# Patient Record
Sex: MALE | Race: WHITE | NOT HISPANIC OR LATINO | Employment: OTHER | URBAN - METROPOLITAN AREA
[De-identification: names, ages, dates, MRNs, and addresses within clinical notes are randomized per-mention and may not be internally consistent; named-entity substitution may affect disease eponyms.]

---

## 2017-06-05 PROBLEM — I49.3 FREQUENT PVCS: Status: ACTIVE | Noted: 2017-06-05

## 2017-06-12 PROBLEM — I27.20 PULMONARY HTN: Status: ACTIVE | Noted: 2017-06-12

## 2017-07-02 PROBLEM — N18.4 CKD (CHRONIC KIDNEY DISEASE), STAGE IV: Status: ACTIVE | Noted: 2017-07-02

## 2017-07-02 PROBLEM — I82.431 ACUTE DEEP VEIN THROMBOSIS (DVT) OF POPLITEAL VEIN OF RIGHT LOWER EXTREMITY: Status: ACTIVE | Noted: 2017-07-02

## 2017-07-02 PROBLEM — J96.11 CHRONIC RESPIRATORY FAILURE WITH HYPOXIA: Status: ACTIVE | Noted: 2017-07-02

## 2017-07-02 PROBLEM — R60.0 BILATERAL LEG EDEMA: Status: ACTIVE | Noted: 2017-07-02

## 2017-07-02 PROBLEM — E66.01 SEVERE OBESITY (BMI >= 40): Status: ACTIVE | Noted: 2017-07-02

## 2017-07-02 PROBLEM — I27.81 COR PULMONALE: Status: ACTIVE | Noted: 2017-07-02

## 2017-07-02 PROBLEM — L03.116 CELLULITIS OF LEFT LOWER EXTREMITY: Status: ACTIVE | Noted: 2017-07-02

## 2017-08-14 PROBLEM — D68.59 THROMBOPHILIA: Status: ACTIVE | Noted: 2017-08-14

## 2017-08-14 PROBLEM — L03.90 CELLULITIS: Status: ACTIVE | Noted: 2017-08-14

## 2017-08-21 PROBLEM — I87.2 VENOUS INSUFFICIENCY OF LEFT LEG: Status: ACTIVE | Noted: 2017-08-21

## 2017-08-25 PROBLEM — G62.9 NEUROPATHY: Status: ACTIVE | Noted: 2017-08-25

## 2017-08-28 PROBLEM — T50.905A DELIRIUM, INDUCED BY DRUG: Status: ACTIVE | Noted: 2017-08-28

## 2017-08-28 PROBLEM — R41.0 DELIRIUM, INDUCED BY DRUG: Status: ACTIVE | Noted: 2017-08-28

## 2017-08-28 PROBLEM — R41.82 ALTERED MENTAL STATE: Status: ACTIVE | Noted: 2017-08-28

## 2017-08-30 PROBLEM — I50.9 ACUTE ON CHRONIC CONGESTIVE HEART FAILURE: Status: ACTIVE | Noted: 2017-08-30

## 2017-09-02 PROBLEM — T50.905A DELIRIUM, INDUCED BY DRUG: Status: RESOLVED | Noted: 2017-08-28 | Resolved: 2017-09-02

## 2017-09-02 PROBLEM — R41.0 DELIRIUM, INDUCED BY DRUG: Status: RESOLVED | Noted: 2017-08-28 | Resolved: 2017-09-02

## 2017-09-19 ENCOUNTER — OFFICE VISIT (OUTPATIENT)
Dept: DERMATOLOGY | Facility: CLINIC | Age: 77
End: 2017-09-19
Payer: COMMERCIAL

## 2017-09-19 VITALS — WEIGHT: 267 LBS | BODY MASS INDEX: 34.27 KG/M2 | HEIGHT: 74 IN

## 2017-09-19 DIAGNOSIS — Z85.828 PERSONAL HISTORY OF OTHER MALIGNANT NEOPLASM OF SKIN: Primary | ICD-10-CM

## 2017-09-19 DIAGNOSIS — Z12.83 SKIN CANCER SCREENING: ICD-10-CM

## 2017-09-19 DIAGNOSIS — L82.0 INFLAMED SEBORRHEIC KERATOSIS: ICD-10-CM

## 2017-09-19 DIAGNOSIS — L30.4 INTERTRIGO: ICD-10-CM

## 2017-09-19 PROCEDURE — 3008F BODY MASS INDEX DOCD: CPT | Mod: S$GLB,,, | Performed by: DERMATOLOGY

## 2017-09-19 PROCEDURE — 99214 OFFICE O/P EST MOD 30 MIN: CPT | Mod: 25,S$GLB,, | Performed by: DERMATOLOGY

## 2017-09-19 PROCEDURE — 1126F AMNT PAIN NOTED NONE PRSNT: CPT | Mod: S$GLB,,, | Performed by: DERMATOLOGY

## 2017-09-19 PROCEDURE — 17110 DESTRUCTION B9 LES UP TO 14: CPT | Mod: S$GLB,,, | Performed by: DERMATOLOGY

## 2017-09-19 PROCEDURE — 1159F MED LIST DOCD IN RCRD: CPT | Mod: S$GLB,,, | Performed by: DERMATOLOGY

## 2017-09-19 PROCEDURE — 99999 PR PBB SHADOW E&M-EST. PATIENT-LVL II: CPT | Mod: PBBFAC,,, | Performed by: DERMATOLOGY

## 2017-09-19 RX ORDER — CLOTRIMAZOLE AND BETAMETHASONE DIPROPIONATE 10; .64 MG/G; MG/G
CREAM TOPICAL
Qty: 45 G | Refills: 2 | Status: SHIPPED | OUTPATIENT
Start: 2017-09-19 | End: 2017-10-24 | Stop reason: ALTCHOICE

## 2017-09-19 NOTE — PROGRESS NOTES
Subjective:       Patient ID:  Xi Unger is a 77 y.o. male who presents for No chief complaint on file.    High risk patient here for f/u, Last office visit 6/2016. Presents today for skin check. Has a spot chest that has been there for years. It has grown in size and is itchy.        History NMSC, BCC- back s/p ED&C, invasive SCC chest s/p excision  Neg FMHx skin cancer.         Recently dx cellulitis, left leg. Currently undergoing IV abx.     Growth on back, tender, weeks, asx no tx  Wears compression hose, not currently while cellulitis flared.     Intertrigo, controlled with lotrisone, desires refill, usually involved inguinal folds and buttocks    Review of Systems   Constitutional: Negative for weight loss, weight gain, fatigue and malaise.   Skin: Negative for daily sunscreen use, activity-related sunscreen use and wears hat.   Hematologic/Lymphatic: Bruises/bleeds easily.        Objective:    Physical Exam   Constitutional: He appears well-developed and well-nourished. No distress.   HENT:   Mouth/Throat: Lips normal.    Eyes: Lids are normal.  No conjunctival no injection.   Cardiovascular: There is dependent edema.     Neurological: He is alert and oriented to person, place, and time. He is not disoriented.   Psychiatric: He has a normal mood and affect.   Skin:   Areas Examined (abnormalities noted in diagram):   Scalp / Hair Palpated and Inspected  Head / Face Inspection Performed  Neck Inspection Performed  Chest / Axilla Inspection Performed  Abdomen Inspection Performed  Back Inspection Performed  RUE Inspected  LUE Inspection Performed  RLE Inspected  LLE Inspection Performed                   Diagram Legend     Erythematous scaling macule/papule c/w actinic keratosis       Vascular papule c/w angioma      Pigmented verrucoid papule/plaque c/w seborrheic keratosis      Yellow umbilicated papule c/w sebaceous hyperplasia      Irregularly shaped tan macule c/w lentigo     1-2 mm smooth white  papules consistent with Milia      Movable subcutaneous cyst with punctum c/w epidermal inclusion cyst      Subcutaneous movable cyst c/w pilar cyst      Firm pink to brown papule c/w dermatofibroma      Pedunculated fleshy papule(s) c/w skin tag(s)      Evenly pigmented macule c/w junctional nevus     Mildly variegated pigmented, slightly irregular-bordered macule c/w mildly atypical nevus      Flesh colored to evenly pigmented papule c/w intradermal nevus       Pink pearly papule/plaque c/w basal cell carcinoma      Erythematous hyperkeratotic cursted plaque c/w SCC      Surgical scar with no sign of skin cancer recurrence      Open and closed comedones      Inflammatory papules and pustules      Verrucoid papule consistent consistent with wart     Erythematous eczematous patches and plaques     Dystrophic onycholytic nail with subungual debris c/w onychomycosis     Umbilicated papule    Erythematous-base heme-crusted tan verrucoid plaque consistent with inflamed seborrheic keratosis     Erythematous Silvery Scaling Plaque c/w Psoriasis     See annotation      Assessment / Plan:        Personal history of other malignant neoplasm of skin  Area(s) of previous NMSC evaluated with no signs of recurrence.    Upper body skin examination performed today including at least 6 points as noted in physical examination. No lesions suspicious for malignancy noted.      Skin cancer screening  Area(s) of previous NMSC evaluated with no signs of recurrence.    Upper body skin examination performed today including at least 6 points as noted in physical examination. No lesions suspicious for malignancy noted.      Intertrigo  -     clotrimazole-betamethasone 1-0.05% (LOTRISONE) cream; aaa bid x 1-2 weeks then prn, avoid chronic use.  Dispense: 45 g; Refill: 2    Inflamed seborrheic keratosis  Cryosurgery procedure note:    Verbal consent from the patient is obtained. Liquid nitrogen cryosurgery is applied to 1 lesions to produce a  freeze injury. The patient is aware that blisters may form and is instructed on wound care with gentle cleansing and use of vaseline ointment to keep moist until healed. The patient is supplied a handout on cryosurgery and is instructed to call if lesions do not completely resolve. Risk of dyspigmentation discussed.                Return in about 6 months (around 3/19/2018).

## 2018-07-24 PROBLEM — L03.116 CELLULITIS OF LEFT LOWER EXTREMITY: Status: RESOLVED | Noted: 2017-07-02 | Resolved: 2018-07-24

## 2018-08-30 PROBLEM — K59.00 CONSTIPATION: Status: ACTIVE | Noted: 2018-08-30

## 2018-09-17 PROBLEM — E61.1 IRON DEFICIENCY: Status: ACTIVE | Noted: 2018-09-17

## 2018-09-27 PROBLEM — I50.9 ACUTE CONGESTIVE HEART FAILURE: Status: ACTIVE | Noted: 2018-09-27

## 2018-09-27 PROBLEM — L03.116 CELLULITIS OF LEFT LOWER EXTREMITY: Status: ACTIVE | Noted: 2018-09-27

## 2018-09-27 PROBLEM — Z86.718 HISTORY OF DVT IN ADULTHOOD: Status: ACTIVE | Noted: 2017-07-02

## 2018-09-27 PROBLEM — I50.31 ACUTE DIASTOLIC HEART FAILURE: Status: ACTIVE | Noted: 2018-09-27

## 2018-09-27 PROBLEM — M54.9 BACK PAIN: Status: ACTIVE | Noted: 2018-09-27
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
## 2018-10-05 ENCOUNTER — OFFICE VISIT (OUTPATIENT)
Dept: INFECTIOUS DISEASES | Facility: CLINIC | Age: 78
End: 2018-10-05
Payer: COMMERCIAL

## 2018-10-05 VITALS
SYSTOLIC BLOOD PRESSURE: 124 MMHG | OXYGEN SATURATION: 95 % | HEART RATE: 59 BPM | BODY MASS INDEX: 30.16 KG/M2 | TEMPERATURE: 97 F | WEIGHT: 235 LBS | HEIGHT: 74 IN | DIASTOLIC BLOOD PRESSURE: 75 MMHG

## 2018-10-05 DIAGNOSIS — D80.1 HYPOGAMMAGLOBULINEMIA: ICD-10-CM

## 2018-10-05 DIAGNOSIS — I87.2 VENOUS INSUFFICIENCY OF LEFT LEG: ICD-10-CM

## 2018-10-05 DIAGNOSIS — R29.898 BILATERAL LEG WEAKNESS: ICD-10-CM

## 2018-10-05 DIAGNOSIS — Z79.2 ENCOUNTER FOR LONG-TERM (CURRENT) USE OF ANTIBIOTICS: Primary | ICD-10-CM

## 2018-10-05 DIAGNOSIS — M00.862 ARTHRITIS OF LEFT KNEE DUE TO OTHER BACTERIA: ICD-10-CM

## 2018-10-05 DIAGNOSIS — R60.0 BILATERAL LEG EDEMA: ICD-10-CM

## 2018-10-05 PROBLEM — M00.9 PYOGENIC ARTHRITIS OF LEFT KNEE JOINT: Status: ACTIVE | Noted: 2018-10-05

## 2018-10-05 PROCEDURE — 3078F DIAST BP <80 MM HG: CPT | Mod: ,,, | Performed by: INTERNAL MEDICINE

## 2018-10-05 PROCEDURE — 99214 OFFICE O/P EST MOD 30 MIN: CPT | Mod: ,,, | Performed by: INTERNAL MEDICINE

## 2018-10-05 PROCEDURE — 3074F SYST BP LT 130 MM HG: CPT | Mod: ,,, | Performed by: INTERNAL MEDICINE

## 2018-10-05 PROCEDURE — 1101F PT FALLS ASSESS-DOCD LE1/YR: CPT | Mod: ,,, | Performed by: INTERNAL MEDICINE

## 2018-10-05 PROCEDURE — 1111F DSCHRG MED/CURRENT MED MERGE: CPT | Mod: ,,, | Performed by: INTERNAL MEDICINE

## 2018-10-05 NOTE — PATIENT INSTRUCTIONS
Please call Dr. Dominguez and ask him to convert if possible, to the subcutaneous injection of immune globulin  Continue oral cephalexin as usual  Elevate the left leg whenever you can  Ask your HH nurse to request labs for his primary/cardiologist to check kidney function  Follow up 4 months

## 2018-10-05 NOTE — PROGRESS NOTES
"Subjective:       Patient ID: Xi Unger is a 78 y.o. male.    Chief Complaint:: Follow-up    HPI  Has not had an infusion of IVIG since July 13. It was felt that the IVIG was volume overloading him.   Has been in the hospital twice since then with CHF and a fall. Given IV ancef while there last week for the possibility of infectious cellulitis but his venous stasis is no change. .   9/25 dr. Chan put "some medicine" in his knee, which is probably a corticosteroid.   His wife has had a lot more difficulty transferring him from chair to bed and their home physical therapy is not adding very much. Discussed bed to chair  height issues    Review of patient's allergies indicates:   Allergen Reactions    Ertapenem sodium      marked weakness to LE's required hospital admission    Fentanyl Shortness Of Breath    Ciprofloxacin     Darvon [propoxyphene]     Dilaudid [hydromorphone]     Doxycycline     Invanz [ertapenem] Other (See Comments)     Severe muscle weakness, unable to walk      Iodinated contrast- oral and iv dye     Iodine and iodide containing products     Percocet [oxycodone-acetaminophen]     Sulfur     Clindamycin Rash    Sulfa (sulfonamide antibiotics) Other (See Comments)     Past Medical History:   Diagnosis Date    Anemia     Anemia of chronic renal failure     Cancer     basal cell carcinoma    Cataract     removed 20 years ago    CHF (congestive heart failure)  Coronary artery disease  Chronic kidney disease  bPH  History of Strongyloides these infection  History of Enterobacter pneumonia March 2015  History of multiple left knee prosthetic joint infections  History of Achromobacter bacteremia July 2016  History of strep bovis bacteremia November 2016  Adenomatous polyps of the colon  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     Chronic edema     legs, no DVT    COPD (chronic obstructive pulmonary disease)     Encounter for blood transfusion     Gout     Macular " degeneration     2018    On home oxygen therapy     at night as needed    Paroxysmal atrial fibrillation     Currently off blood thinners on Amiodarone     Past Surgical History:   Procedure Laterality Date    AORTIC VALVE REPLACEMENT  2010    CARDIAC SURGERY      CABG, s/p AVR    CARDIAC VALVE REPLACEMENT      aortic valve porcine    COLON SURGERY      COLONOSCOPY Left 11/10/2016    Procedure: COLONOSCOPY;  Surgeon: Eliel Basurto MD;  Location: [facility];  Service: Endoscopy;  Laterality: Left;    COLONOSCOPY Left 11/10/2016    Performed by [facility] at [facility] ENDO    CORONARY STENT PLACEMENT      approx. 2006    EYE SURGERY      cataracts    HEART CATH-RIGHT Right 2017    Performed by Anthony Dumont MD at [facility] CATH    HERNIA REPAIR      INSERTION-PICC N/A 10/25/2017    Performed by [facility] at [facility] ENDO    INSERTION-PICC N/A 10/4/2017    Performed by [facility], [facility] at [facility] ENDO    JOINT REPLACEMENT      bilateral knee replacements; revision left x 2    loop recorder      PICC LINE N/A 2016    Performed by [facility] at [facility]    REVISION-ARTHROPLASTY-KNEE-TOTAL WITH NAVIGATION Left 10/27/2014    [facility]ober[facility] at [facility] OR    SKIN BIOPSY      TONSILLECTOMY      TRANSESOPHAGEAL ECHOCARDIOGRAM (LISS) N/A 2016    [facility] Glen [facility] at [facility]    TRANSESOPHAGEAL ECHOCARDIOGRAM (LISS) N/A 8/15/2016    Performed by Glen Maxwell MD at [facility]   cataract extraction  Carpal tunnel repair  Social History     Tobacco Use    Smoking status: Former Smoker     Last attempt to quit: 1985     Years since quittin.7    Smokeless tobacco: Never Used   Substance Use Topics    Alcohol use: No     Social History     Occupational History    Occupation: retired     Family History   Problem Relation Age of Onset    Heart disease Mother     Heart disease Father        Pertinent medications noted:     Review of Systems    Constitutional:  "No fever, chills, sweats,has fatigue, weakness  Eyes: No change in vision,     ENT: No sore throat, mouth pains, or lesions    Cardiovascular: No chest pain, hasDOE, and chronic pedal edema, oxygen dependent    Respiratory: No increaseshortness of breath, RANDOLPH, cough,  Gastrointestinal: No abdominal pain, nausea, vomiting, diarrhea, or focal abdominal pain    Genitourinary:     Musculoskeletal: has no discomfort left medial thigh and knee and lower leg since his fall2 weeks ago. No bruising    Integumentary: No new rashes, skin lesions, or wounds. Chronic venous stasis left greater than right    Neurological: No dizziness, vertigo, unusual headaches,    Psychiatric: No anxiety, depression    Endocrine: o diabetes  Lymphatic: No lymphadenopathy, blood loss,  VAD:     Objective:      Blood pressure 124/75, pulse (!) 59, temperature 97 °F (36.1 °C), height 6' 2" (1.88 m), weight 106.6 kg (235 lb), SpO2 95 %. Body mass index is 30.17 kg/m².  Physical Exam      General: Alert and attentive, cooperative and in no distress. In a wheelchair, oxygen dependent    Eyes: Pupils equal, round, reactive to light, anicteric, EOMI    Neck: Supple, non-tender, no thyromegaly or masses    ENT: nares patent, no oral lesions,  no thrush    Integumentary:chronic venous stasis hyperemia left lower leg    Cardiovascular: Regular rate and rhythm, no murmurs, rubs, or gallop    Respiratory: very mild bibasal crackles    Gastrointestinal:  No distention    Genitourinary:     Breasts:     Rectal/Anal:     Vascular: chronic venous stasis left greater than right, Tubigrip stockings in place  Musculoskeletal:  no acute arthritis, synovitis or myositis. equires wheelchair    Lymphatic:   Neurological: Normal LOC,  speech,  Psychiatric: Normal mood, speech,  demeanor     Wound:    VAD:        Recent Diagnostics:review laboratory studies in [facility] from [facility] hospitalization       Assessment:       Problem List Items Addressed This Visit     " Bilateral leg weakness    Bilateral leg edema    Venous insufficiency of left leg    Hypogammaglobulinemia    Pyogenic arthritis of left knee joint    Encounter for long-term (current) use of antibiotics - Primary           Plan:         Please call Dr. Dominguez and ask him to convert if possible, to the subcutaneous injection of immune globulin  Continue oral cephalexin as usual  Elevate the left leg whenever you can  Ask your HH nurse to request labs for his primary/cardiologist to check kidney function  Follow up 4 months            This note was created using Dragon voice recognition software that occasionally misinterpreted phrases or words.

## 2018-10-09 ENCOUNTER — TELEPHONE (OUTPATIENT)
Dept: INFECTIOUS DISEASES | Facility: CLINIC | Age: 78
End: 2018-10-09

## 2018-10-09 PROBLEM — L02.416 CELLULITIS AND ABSCESS OF LEFT LOWER EXTREMITY: Status: ACTIVE | Noted: 2018-10-09

## 2018-10-09 PROBLEM — L03.116 CELLULITIS AND ABSCESS OF LEFT LOWER EXTREMITY: Status: ACTIVE | Noted: 2018-10-09

## 2018-10-09 PROBLEM — I50.32 CHRONIC DIASTOLIC HEART FAILURE: Status: ACTIVE | Noted: 2018-09-27

## 2019-02-06 PROBLEM — J96.11 CHRONIC RESPIRATORY FAILURE WITH HYPOXIA: Status: RESOLVED | Noted: 2017-07-02 | Resolved: 2019-02-06

## 2019-02-06 PROBLEM — J96.20 ACUTE ON CHRONIC RESPIRATORY FAILURE: Status: ACTIVE | Noted: 2019-02-06

## 2019-02-06 PROBLEM — I50.9 ACUTE ON CHRONIC CONGESTIVE HEART FAILURE: Status: ACTIVE | Noted: 2019-02-06

## 2019-02-06 PROBLEM — I82.5Z2 CHRONIC DEEP VEIN THROMBOSIS (DVT) OF DISTAL VEIN OF LEFT LOWER EXTREMITY: Status: ACTIVE | Noted: 2019-02-06

## 2019-02-06 PROBLEM — D64.9 CHRONIC ANEMIA: Status: ACTIVE | Noted: 2019-02-06

## 2019-02-07 PROBLEM — I50.43 ACUTE ON CHRONIC COMBINED SYSTOLIC (CONGESTIVE) AND DIASTOLIC (CONGESTIVE) HEART FAILURE: Status: ACTIVE | Noted: 2019-02-06

## 2019-03-04 PROBLEM — R41.82 ALTERED MENTAL STATE: Status: RESOLVED | Noted: 2017-08-28 | Resolved: 2019-03-04

## 2019-03-04 PROBLEM — J96.20 ACUTE ON CHRONIC RESPIRATORY FAILURE: Status: RESOLVED | Noted: 2019-02-06 | Resolved: 2019-03-04

## 2019-03-06 PROBLEM — M00.9 PYOGENIC ARTHRITIS OF LEFT KNEE JOINT: Status: RESOLVED | Noted: 2018-10-05 | Resolved: 2019-03-06

## 2019-03-06 PROBLEM — L03.116 CELLULITIS OF LEFT LOWER EXTREMITY: Status: RESOLVED | Noted: 2018-09-27 | Resolved: 2019-03-06

## 2019-03-08 DIAGNOSIS — I27.81 COR PULMONALE: ICD-10-CM

## 2019-03-08 DIAGNOSIS — J43.1 PANLOBULAR EMPHYSEMA: ICD-10-CM

## 2019-03-08 DIAGNOSIS — I10 ESSENTIAL HYPERTENSION: ICD-10-CM

## 2019-03-08 DIAGNOSIS — I15.0 RENOVASCULAR HYPERTENSION: ICD-10-CM

## 2019-03-08 DIAGNOSIS — Z99.81 ON HOME OXYGEN THERAPY: ICD-10-CM

## 2019-03-08 DIAGNOSIS — T84.498S: Primary | ICD-10-CM

## 2019-03-08 DIAGNOSIS — E78.5 HYPERLIPIDEMIA, UNSPECIFIED HYPERLIPIDEMIA TYPE: ICD-10-CM

## 2019-03-08 DIAGNOSIS — I50.43 ACUTE ON CHRONIC COMBINED SYSTOLIC (CONGESTIVE) AND DIASTOLIC (CONGESTIVE) HEART FAILURE: ICD-10-CM

## 2019-03-08 DIAGNOSIS — B35.6 TINEA CRURIS: ICD-10-CM

## 2019-03-08 DIAGNOSIS — I25.84 CORONARY ARTERY DISEASE DUE TO CALCIFIED CORONARY LESION: ICD-10-CM

## 2019-03-08 DIAGNOSIS — I49.3 FREQUENT PVCS: ICD-10-CM

## 2019-03-08 DIAGNOSIS — I25.10 CORONARY ARTERY DISEASE DUE TO CALCIFIED CORONARY LESION: ICD-10-CM

## 2019-03-08 DIAGNOSIS — I50.32 CHRONIC DIASTOLIC CONGESTIVE HEART FAILURE, NYHA CLASS 3: ICD-10-CM

## 2019-03-08 DIAGNOSIS — I48.0 PAROXYSMAL ATRIAL FIBRILLATION: ICD-10-CM

## 2019-03-08 DIAGNOSIS — G62.9 NEUROPATHY: ICD-10-CM

## 2019-03-08 DIAGNOSIS — Z95.2 S/P AVR (AORTIC VALVE REPLACEMENT): ICD-10-CM

## 2019-03-08 DIAGNOSIS — I87.2 VENOUS INSUFFICIENCY OF LEFT LEG: ICD-10-CM

## 2019-03-08 DIAGNOSIS — I27.20 PULMONARY HYPERTENSION: ICD-10-CM

## 2019-03-08 RX ORDER — CEPHALEXIN 500 MG/1
500 CAPSULE ORAL EVERY 12 HOURS
Qty: 60 CAPSULE | Refills: 6 | Status: ON HOLD | OUTPATIENT
Start: 2019-03-08 | End: 2019-12-26 | Stop reason: SDUPTHER

## 2019-03-11 ENCOUNTER — TELEPHONE (OUTPATIENT)
Dept: INFECTIOUS DISEASES | Facility: CLINIC | Age: 79
End: 2019-03-11

## 2019-03-11 RX ORDER — CEPHALEXIN 500 MG/1
500 CAPSULE ORAL EVERY 12 HOURS
Qty: 180 CAPSULE | Refills: 3 | Status: SHIPPED | OUTPATIENT
Start: 2019-03-11 | End: 2019-04-22 | Stop reason: SDUPTHER

## 2019-03-11 NOTE — TELEPHONE ENCOUNTER
----- Message from Grace Key sent at 3/11/2019 10:11 AM CDT -----  Contact: MRS MASTERSON [phone]  HAD RX FOR CEPHALEXIN 500 MG SENT TO [facility] AND ONLY 1 MONTH SENT IN. HE WILL BE 15 DAYS SHORT BEFORE HIS APPOINTMENT. iS THERE A REASON ONLY 1 MONTH SENT IN?

## 2019-03-11 NOTE — TELEPHONE ENCOUNTER
I signed an order with 60 tablets and 6 refills  Are they supposed to get 90 days supples?  I sent another rx for 90 days supply with 3 refills

## 2019-04-22 ENCOUNTER — OFFICE VISIT (OUTPATIENT)
Dept: INFECTIOUS DISEASES | Facility: CLINIC | Age: 79
End: 2019-04-22
Payer: COMMERCIAL

## 2019-04-22 VITALS
DIASTOLIC BLOOD PRESSURE: 82 MMHG | HEART RATE: 52 BPM | BODY MASS INDEX: 29.26 KG/M2 | HEIGHT: 74 IN | SYSTOLIC BLOOD PRESSURE: 120 MMHG | OXYGEN SATURATION: 93 % | WEIGHT: 228 LBS

## 2019-04-22 DIAGNOSIS — I87.2 VENOUS INSUFFICIENCY OF LEFT LEG: ICD-10-CM

## 2019-04-22 DIAGNOSIS — D80.1 HYPOGAMMAGLOBULINEMIA: ICD-10-CM

## 2019-04-22 DIAGNOSIS — R60.0 BILATERAL LEG EDEMA: ICD-10-CM

## 2019-04-22 DIAGNOSIS — Z79.2 ENCOUNTER FOR LONG-TERM (CURRENT) USE OF ANTIBIOTICS: Primary | ICD-10-CM

## 2019-04-22 DIAGNOSIS — M00.862 ARTHRITIS OF LEFT KNEE DUE TO OTHER BACTERIA: ICD-10-CM

## 2019-04-22 DIAGNOSIS — R29.898 BILATERAL LEG WEAKNESS: ICD-10-CM

## 2019-04-22 PROCEDURE — 99213 PR OFFICE/OUTPT VISIT, EST, LEVL III, 20-29 MIN: ICD-10-PCS | Mod: ,,, | Performed by: INTERNAL MEDICINE

## 2019-04-22 PROCEDURE — 3079F PR MOST RECENT DIASTOLIC BLOOD PRESSURE 80-89 MM HG: ICD-10-PCS | Mod: ,,, | Performed by: INTERNAL MEDICINE

## 2019-04-22 PROCEDURE — 3074F PR MOST RECENT SYSTOLIC BLOOD PRESSURE < 130 MM HG: ICD-10-PCS | Mod: ,,, | Performed by: INTERNAL MEDICINE

## 2019-04-22 PROCEDURE — 99213 OFFICE O/P EST LOW 20 MIN: CPT | Mod: ,,, | Performed by: INTERNAL MEDICINE

## 2019-04-22 PROCEDURE — 1101F PR PT FALLS ASSESS DOC 0-1 FALLS W/OUT INJ PAST YR: ICD-10-PCS | Mod: ,,, | Performed by: INTERNAL MEDICINE

## 2019-04-22 PROCEDURE — 3079F DIAST BP 80-89 MM HG: CPT | Mod: ,,, | Performed by: INTERNAL MEDICINE

## 2019-04-22 PROCEDURE — 3074F SYST BP LT 130 MM HG: CPT | Mod: ,,, | Performed by: INTERNAL MEDICINE

## 2019-04-22 PROCEDURE — 1101F PT FALLS ASSESS-DOCD LE1/YR: CPT | Mod: ,,, | Performed by: INTERNAL MEDICINE

## 2019-04-22 RX ORDER — METHOCARBAMOL 500 MG/1
500 TABLET, FILM COATED ORAL
Refills: 2 | COMMUNITY
Start: 2019-04-09 | End: 2019-05-15

## 2019-04-22 NOTE — PROGRESS NOTES
"Subjective:       Patient ID: Xi Unger is a 79 y.o. male.    Chief Complaint:: Follow-up (for antibodics )    HPI  10/5/18: Has not had an infusion of IVIG since July 13. It was felt that the IVIG was volume overloading him.   Has been in the hospital twice since then with CHF and a fall. Given IV ancef while there last week for the possibility of infectious cellulitis but his venous stasis is no change. .   9/25 dr. Chan put "some medicine" in his knee, which is probably a corticosteroid.   His wife has had a lot more difficulty transferring him from chair to bed and their home physical therapy is not adding very much. Discussed bed to chair  height issues    4/22/19: since last visit, he was admitted 10/9 because of inability to walk or stand, treated for cellulitis with a week of zyvox and sent to rehab at [facility], which he did not like and made no progress. He cannot stand due to weakness, but can make a transfer from chair to car. He had another ED visit for nosebleed, required packing and subsequent removal by ENT, was off Eliquis x 2 weeks. He is stable on keflex for suppression of peripheral cellulitis. He is not receiving IVIG because he cannot tolerate the fluid volume. Still taking the keflex bid and took it 4/day while nose was packed because he had "a sinus infection" per ENT. Probably is not elevating his legs as much as he should at home. Is compliant with compression stockings. His wife denies that he has any skin breakdown of his feet or intertriginous areas.    Review of patient's allergies indicates:   Allergen Reactions    Ertapenem sodium      marked weakness to LE's required hospital admission    Fentanyl Shortness Of Breath    Ciprofloxacin     Darvon [propoxyphene]     Dilaudid [hydromorphone]     Doxycycline     Invanz [ertapenem] Other (See Comments)     Severe muscle weakness, unable to walk      Iodinated contrast- oral and iv dye     Iodine and iodide containing products "     Percocet [oxycodone-acetaminophen]     Sulfur     Clindamycin Rash    Sulfa (sulfonamide antibiotics) Other (See Comments)     Past Medical History:   Diagnosis Date    Anemia     Anemia of chronic renal failure     Cancer     basal cell carcinoma    Cataract     removed 20 years ago    CHF (congestive heart failure)  Coronary artery disease  Chronic kidney disease  bPH  History of Strongyloides infection  History of Enterobacter pneumonia March 2015  History of multiple left knee prosthetic joint infections  History of Achromobacter bacteremia July 2016  History of strep bovis bacteremia November 2016  Adenomatous polyps of the colon  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     Chronic edema     legs, no DVT    COPD (chronic obstructive pulmonary disease)     Encounter for blood transfusion     Gout     Macular degeneration     07/2018    On home oxygen therapy     at night as needed    Paroxysmal atrial fibrillation     Currently off blood thinners on Amiodarone     Past Surgical History:   Procedure Laterality Date    AORTIC VALVE REPLACEMENT  2010    CARDIAC SURGERY  2010    CABG, s/p AVR    CARDIAC VALVE REPLACEMENT      aortic valve porcine    COLON SURGERY      COLONOSCOPY Left 11/10/2016    [facility]formed by [facility] at [facility] ENDO    CORONARY STENT PLACEMENT      approx. 2006    EYE SURGERY      cataracts    HEART CATH-RIGHT Right 6/12/2017    Performed by Anthony Dumont MD at [facility] CATH    HERNIA REPAIR      INSERTION-PICC N/A 10/25/2017    Performed by [facility], [facility] at [facility] ENDO    INSERTION-PICC N/A 10/4/2017    Performed by [facility], [facility] at [facility] ENDO    JOINT REPLACEMENT      bilateral knee replacements; revision left x 2    loop recorder      PICC LINE N/A 9/9/2016    Performed by [facility], [facility] at [facility] ENDO    REVISION-ARTHROPLASTY-KNEE-TOTAL WITH NAVIGATION Left 10/27/2014    Performed by Rigoberto Noel [facility] at [facility] OR    SKIN BIOPSY      TONSILLECTOMY       "TRANSESOPHAGEAL ECHOCARDIOGRAM (LISS) N/A 2016    Performed by Glen Maxwell [facility] at [facility]    TRANSESOPHAGEAL ECHOCARDIOGRAM (LISS) N/A 8/15/2016    Performed by Glen Maxwell MD at [facility]   cataract extraction  Carpal tunnel repair  Social History     Tobacco Use    Smoking status: Former Smoker     Last attempt to quit: 1985     Years since quittin.3    Smokeless tobacco: Never Used   Substance Use Topics    Alcohol use: No     Social History     Occupational History    Occupation: retired     Family History   Problem Relation Age of Onset    Heart disease Mother     Heart disease Father        Pertinent medications noted:     Review of Systems    Constitutional: No fever, chills, sweats,has fatigue, weakness  Eyes: No change in vision,     ENT: No sore throat, mouth pains, or lesions    Cardiovascular: No chest pain, hasDOE, and chronic pedal edema, oxygen dependent    Respiratory: No increase in shortness of breath, RANDOLPH, cough,  Gastrointestinal: No abdominal pain, nausea, vomiting, diarrhea, or focal abdominal pain    Genitourinary:     Musculoskeletal: Left knee is always painful and he is probably slowly developing a flexion contracture of the left knee    Integumentary: No new rashes, skin lesions, or wounds. Chronic venous stasis left greater than right    Neurological: No dizziness, vertigo, unusual headaches,    Psychiatric: No anxiety, depression    Endocrine: o diabetes  Lymphatic: No lymphadenopathy, blood loss,  VAD:     Objective:      Blood pressure 120/82, pulse (!) 52, height 6' 2" (1.88 m), weight 103.4 kg (228 lb), SpO2 (!) 93 %. Body mass index is 29.27 kg/m².  Physical Exam      General: Alert and attentive, cooperative and in no distress. In a wheelchair, oxygen dependent    Eyes: Pupils equal, round, reactive to light, anicteric, EOMI    Neck: Supple, non-tender, no thyromegaly or masses    ENT: nares patent, no oral lesions,  no thrush    Integumentary:chronic venous " stasis hyperemia left lower leg    Cardiovascular: Regular rate and rhythm, no murmurs, rubs, or gallop    Respiratory: very mild bibasal crackles    Gastrointestinal:  No distention    Genitourinary:     Breasts:     Rectal/Anal:     Vascular: chronic venous stasis left greater than right, compression stockings in place  Musculoskeletal:  no acute arthritis, synovitis or myositis. requires wheelchair. No palpable fluid in or around the left knee. His ability to extend the leg is painful due to pain in the left knee.    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      Return in 6 months  This note was created using Dragon voice recognition software that occasionally misinterpreted phrases or words.

## 2019-04-22 NOTE — PATIENT INSTRUCTIONS
Continue the keflex 500 mg twice a day  If you think he is having a flare up, increase to 4 times a day and do strict elevation and let me know  Try to keep the leg elevated all the time, with breaks for meals and bathing.      Return in 6 months

## 2019-06-18 PROBLEM — M47.816 FACET HYPERTROPHY OF LUMBAR REGION: Status: ACTIVE | Noted: 2019-06-18

## 2019-06-28 PROBLEM — N25.81 SECONDARY HYPERPARATHYROIDISM: Status: ACTIVE | Noted: 2019-06-28

## 2019-07-09 PROBLEM — M47.9 SPONDYLARTHROSIS: Status: ACTIVE | Noted: 2019-07-09

## 2019-07-19 ENCOUNTER — TELEPHONE (OUTPATIENT)
Dept: PAIN MEDICINE | Facility: CLINIC | Age: 79
End: 2019-07-19

## 2019-07-19 NOTE — TELEPHONE ENCOUNTER
----- Message from Ghislaine Ross sent at 7/19/2019  1:56 PM CDT -----  Contact: Unsocial request  Message     Appointment Request From: Xi Unger    With Provider: Dr. Priyank Montoya    Preferred Date Range: Any    Preferred Times: Any time    Reason for visit: Back Pain    Comments:  Back pain

## 2019-09-16 PROBLEM — I63.9 CVA (CEREBRAL VASCULAR ACCIDENT): Status: ACTIVE | Noted: 2019-09-16

## 2019-10-04 ENCOUNTER — OFFICE VISIT (OUTPATIENT)
Dept: INFECTIOUS DISEASES | Facility: CLINIC | Age: 79
End: 2019-10-04
Payer: COMMERCIAL

## 2019-10-04 VITALS
OXYGEN SATURATION: 93 % | SYSTOLIC BLOOD PRESSURE: 124 MMHG | DIASTOLIC BLOOD PRESSURE: 72 MMHG | WEIGHT: 223 LBS | HEART RATE: 55 BPM | BODY MASS INDEX: 28.62 KG/M2 | HEIGHT: 74 IN | TEMPERATURE: 98 F

## 2019-10-04 DIAGNOSIS — Z79.2 ENCOUNTER FOR LONG-TERM (CURRENT) USE OF ANTIBIOTICS: Primary | ICD-10-CM

## 2019-10-04 DIAGNOSIS — M00.862 ARTHRITIS OF LEFT KNEE DUE TO OTHER BACTERIA: ICD-10-CM

## 2019-10-04 DIAGNOSIS — I87.2 VENOUS INSUFFICIENCY OF LEFT LEG: ICD-10-CM

## 2019-10-04 DIAGNOSIS — R60.0 BILATERAL LEG EDEMA: ICD-10-CM

## 2019-10-04 PROCEDURE — 3078F DIAST BP <80 MM HG: CPT | Mod: S$GLB,,, | Performed by: INTERNAL MEDICINE

## 2019-10-04 PROCEDURE — 3074F SYST BP LT 130 MM HG: CPT | Mod: S$GLB,,, | Performed by: INTERNAL MEDICINE

## 2019-10-04 PROCEDURE — 99214 OFFICE O/P EST MOD 30 MIN: CPT | Mod: S$GLB,,, | Performed by: INTERNAL MEDICINE

## 2019-10-04 PROCEDURE — 1101F PT FALLS ASSESS-DOCD LE1/YR: CPT | Mod: S$GLB,,, | Performed by: INTERNAL MEDICINE

## 2019-10-04 PROCEDURE — 3078F PR MOST RECENT DIASTOLIC BLOOD PRESSURE < 80 MM HG: ICD-10-PCS | Mod: S$GLB,,, | Performed by: INTERNAL MEDICINE

## 2019-10-04 PROCEDURE — 3074F PR MOST RECENT SYSTOLIC BLOOD PRESSURE < 130 MM HG: ICD-10-PCS | Mod: S$GLB,,, | Performed by: INTERNAL MEDICINE

## 2019-10-04 PROCEDURE — 1101F PR PT FALLS ASSESS DOC 0-1 FALLS W/OUT INJ PAST YR: ICD-10-PCS | Mod: S$GLB,,, | Performed by: INTERNAL MEDICINE

## 2019-10-04 PROCEDURE — 99214 PR OFFICE/OUTPT VISIT, EST, LEVL IV, 30-39 MIN: ICD-10-PCS | Mod: S$GLB,,, | Performed by: INTERNAL MEDICINE

## 2019-10-04 RX ORDER — CEPHALEXIN 500 MG/1
500 CAPSULE ORAL EVERY 12 HOURS
Qty: 180 CAPSULE | Refills: 3 | Status: ON HOLD | OUTPATIENT
Start: 2019-10-04 | End: 2019-12-30 | Stop reason: HOSPADM

## 2019-10-04 NOTE — PROGRESS NOTES
"Subjective:       Patient ID: Xi Unger is a 79 y.o. male.    Chief Complaint:: Encounter for long-term current use of antibiotics    HPI  10/5/18: Has not had an infusion of IVIG since July 13. It was felt that the IVIG was volume overloading him.   Has been in the hospital twice since then with CHF and a fall. Given IV ancef while there last week for the possibility of infectious cellulitis but his venous stasis is no change. .   9/25 dr. Chan put "some medicine" in his knee, which is probably a corticosteroid.   His wife has had a lot more difficulty transferring him from chair to bed and their home physical therapy is not adding very much. Discussed bed to chair  height issues    4/22/19: since last visit, he was admitted 10/9 because of inability to walk or stand, treated for cellulitis with a week of zyvox and sent to rehab at [facility], which he did not like and made no progress. He cannot stand due to weakness, but can make a transfer from chair to car. He had another ED visit for nosebleed, required packing and subsequent removal by ENT, was off Eliquis x 2 weeks. He is stable on keflex for suppression of peripheral cellulitis. He is not receiving IVIG because he cannot tolerate the fluid volume. Still taking the keflex bid and took it 4/day while nose was packed because he had "a sinus infection" per ENT. Probably is not elevating his legs as much as he should at home. Is compliant with compression stockings. His wife denies that he has any skin breakdown of his feet or intertriginous areas.  10/4/19: admitted to [facility] 9/11-22,  with acute onset of CVA and received TPA and endovascular thrombectomy with excellent result. He  Remains on cephalexin for suppression of cellulitis and left TKA  Infection.  Has had a number of nosebleeds, last while he was at [facility]. He did receive blood as well for TPA related blood loss and nosebleed. She has been referred to Dr. Poole.   Flu vaccine has been given.  " Creatinine most recently was 2.5.  Tolerating cephalexin, no episodes of acute cellulitis.    Review of patient's allergies indicates:   Allergen Reactions    Ertapenem sodium      marked weakness to LE's required hospital admission    Fentanyl Shortness Of Breath    Ciprofloxacin     Darvon [propoxyphene]     Dilaudid [hydromorphone]     Doxycycline     Invanz [ertapenem] Other (See Comments)     Severe muscle weakness, unable to walk      Iodinated contrast- oral and iv dye     Iodine and iodide containing products     Percocet [oxycodone-acetaminophen]     Sulfur     Clindamycin Rash    Sulfa (sulfonamide antibiotics) Other (See Comments)     Past Medical History:   Diagnosis Date    Anemia     Anemia of chronic renal failure     Cancer     basal cell carcinoma    Cataract     removed 20 years ago    CHF (congestive heart failure)  Coronary artery disease  Chronic kidney disease  bPH  History of Strongyloides infection  History of Enterobacter pneumonia March 2015  History of multiple left knee prosthetic joint infections  History of Achromobacter bacteremia July 2016  History of strep bovis bacteremia November 2016  Adenomatous polyps of the colon  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     Chronic edema     legs, no DVT    COPD (chronic obstructive pulmonary disease)     Encounter for blood transfusion     Gout     Macular degeneration     07/2018    On home oxygen therapy     at night as needed    Paroxysmal atrial fibrillation     Currently off blood thinners on Amiodarone   CVA 9/11/19 treated with tPA an endovascular thrombectomy at [facility]  Past Surgical History:   Procedure Laterality Date    AORTIC VALVE REPLACEMENT  2010    CARDIAC SURGERY  2010    CABG, s/p AVR    CARDIAC VALVE REPLACEMENT      aortic valve porcine    COLON SURGERY      COLONOSCOPY Left 11/10/2016    Procedure: COLONOSCOPY;  Surgeon: Eliel Basurto MD;  Location: [facility];  Service:  Endoscopy;  Laterality: Left;    CORONARY STENT PLACEMENT      approx. 2006    EYE SURGERY      cataracts    HERNIA REPAIR      JOINT REPLACEMENT      bilateral knee replacements; revision left x 2    loop recorder      LUMBAR PARAVERTEBRAL FACET JOINT NERVE BLOCK N/A 2019    Procedure: BLOCK, NERVE, FACET JOINT, LUMBAR L4-S1 Bilateral;  Surgeon: Bri Douglas II, MD;  Location: [facility];  Service: Pain Management;  Laterality: N/A;    LUMBAR PARAVERTEBRAL FACET JOINT NERVE BLOCK Bilateral 2019    Procedure: BLOCK, NERVE, FACET JOINT, LUMBAR, L4-S1;  Surgeon: Bri Douglas II, MD;  Location: [facility];  Service: Pain Management;  Laterality: Bilateral;    SKIN BIOPSY      TONSILLECTOMY     cataract extraction  Carpal tunnel repair  Social History     Tobacco Use    Smoking status: Former Smoker     Last attempt to quit: 1985     Years since quittin.7    Smokeless tobacco: Never Used   Substance Use Topics    Alcohol use: No     Social History     Occupational History    Occupation: retired     Family History   Problem Relation Age of Onset    Heart disease Mother     Heart disease Father        Pertinent medications noted:     Review of Systems    Constitutional: No fever, chills, sweats,has fatigue, weakness which is perhaps a little worse, but not new  Eyes: No change in vision,     ENT: No sore throat, mouth pains, or lesions    Cardiovascular: No chest pain, hasDOE, and chronic pedal edema, improved lately, oxygen dependent    Respiratory: No increase in shortness of breath, RANDOLPH, cough,  Gastrointestinal: No abdominal pain, nausea, vomiting, diarrhea, or focal abdominal pain    Genitourinary:     Musculoskeletal: Left knee is always painful and he has developed a flexion contracture of the left knee    Integumentary: No new rashes, skin lesions, or wounds. Chronic venous stasis left greater than right, better than usual    Neurological: No dizziness, vertigo, unusual headaches,  "but his wife finds him occasionally confused since the stroke    Psychiatric:  Seems depressed, perhaps a little more withdrawn than usual    Endocrine: o diabetes  Lymphatic: No lymphadenopathy, blood loss,  VAD:     Objective:      Blood pressure 124/72, pulse (!) 55, temperature 97.9 °F (36.6 °C), temperature source Temporal, height 6' 2" (1.88 m), weight 101.2 kg (223 lb), SpO2 (!) 93 %. Body mass index is 28.63 kg/m².  Physical Exam      General: Alert and attentive, cooperative and in no distress. In a wheelchair, oxygen dependent    Eyes:   anicteric, EOMI    Neck: Supple,     ENT: nares patent, no oral lesions,  no thrush    Integumentary:chronic venous stasis hyperpigmentation left lower leg    Cardiovascular: iRegular rate and rhythm, no murmurs, rubs, or gallop    Respiratory: very mild bibasal crackles    Gastrointestinal:  No distention    Genitourinary:     Breasts:     Rectal/Anal:     Vascular: chronic venous stasis left greater than right, compression stockings in place and looks much better than usual and his legs are smaller than usual  Musculoskeletal:  no acute arthritis, synovitis or myositis. requires wheelchair. No palpable fluid in or around the left knee. His ability to extend the leg is painful due to pain in the left knee.  He has lost tremendous amount of range of motion of the left knee.  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  Receiving immunoglobulin supplementation?  This note was created using Dragon voice recognition software that occasionally misinterpreted phrases or words.

## 2019-12-25 PROBLEM — I50.21 ACUTE SYSTOLIC CONGESTIVE HEART FAILURE: Status: ACTIVE | Noted: 2019-12-25

## 2019-12-26 PROBLEM — R79.89 ELEVATED TROPONIN: Status: ACTIVE | Noted: 2019-12-26

## 2019-12-26 PROBLEM — N18.4 CKD (CHRONIC KIDNEY DISEASE), STAGE IV: Status: ACTIVE | Noted: 2019-12-26

## 2020-01-27 PROBLEM — I50.9 CONGESTIVE HEART FAILURE: Status: ACTIVE | Noted: 2020-01-27

## 2020-01-29 PROBLEM — R04.0 EPISTAXIS: Status: ACTIVE | Noted: 2020-01-29

## 2020-03-11 PROBLEM — N17.9 AKI (ACUTE KIDNEY INJURY): Status: ACTIVE | Noted: 2020-03-11

## 2020-03-11 PROBLEM — K92.2 ACUTE GASTROINTESTINAL HEMORRHAGE: Status: ACTIVE | Noted: 2020-03-11

## 2020-03-13 PROBLEM — K92.2 GASTROINTESTINAL HEMORRHAGE: Status: ACTIVE | Noted: 2020-03-13

## 2020-03-17 PROBLEM — D64.9 SYMPTOMATIC ANEMIA: Status: ACTIVE | Noted: 2020-03-17

## 2020-03-17 PROBLEM — D62 ACUTE BLOOD LOSS ANEMIA: Status: ACTIVE | Noted: 2020-03-17

## 2020-03-25 ENCOUNTER — EXTERNAL HOME HEALTH (OUTPATIENT)
Dept: HOME HEALTH SERVICES | Facility: HOSPITAL | Age: 80
End: 2020-03-25

## 2020-04-01 ENCOUNTER — TELEPHONE (OUTPATIENT)
Dept: HOME HEALTH SERVICES | Facility: HOSPITAL | Age: 80
End: 2020-04-01

## 2020-04-15 PROBLEM — K92.2 ACUTE GASTROINTESTINAL HEMORRHAGE: Status: RESOLVED | Noted: 2020-03-11 | Resolved: 2020-04-15

## 2020-04-15 PROBLEM — K92.2 GASTROINTESTINAL HEMORRHAGE: Status: RESOLVED | Noted: 2020-03-13 | Resolved: 2020-04-15

## 2020-04-15 PROBLEM — D64.9 SYMPTOMATIC ANEMIA: Status: RESOLVED | Noted: 2020-03-17 | Resolved: 2020-04-15

## 2020-04-15 PROBLEM — N17.9 AKI (ACUTE KIDNEY INJURY): Status: RESOLVED | Noted: 2020-03-11 | Resolved: 2020-04-15

## 2020-04-15 PROBLEM — I50.21 ACUTE SYSTOLIC CONGESTIVE HEART FAILURE: Status: RESOLVED | Noted: 2019-12-25 | Resolved: 2020-04-15

## 2020-07-10 PROBLEM — Z86.73 HISTORY OF CVA (CEREBROVASCULAR ACCIDENT): Status: ACTIVE | Noted: 2020-07-10

## 2020-07-29 DIAGNOSIS — Z79.2 ENCOUNTER FOR LONG-TERM (CURRENT) USE OF ANTIBIOTICS: ICD-10-CM

## 2020-07-29 DIAGNOSIS — B35.6 TINEA CRURIS: Primary | ICD-10-CM

## 2020-07-29 RX ORDER — CEPHALEXIN 500 MG/1
500 CAPSULE ORAL EVERY 12 HOURS
Qty: 180 CAPSULE | Refills: 2 | Status: SHIPPED | OUTPATIENT
Start: 2020-07-29 | End: 2020-07-30 | Stop reason: SDUPTHER

## 2020-08-26 ENCOUNTER — OFFICE VISIT (OUTPATIENT)
Dept: INFECTIOUS DISEASES | Facility: CLINIC | Age: 80
End: 2020-08-26
Payer: COMMERCIAL

## 2020-08-26 DIAGNOSIS — Z79.2 ENCOUNTER FOR LONG-TERM (CURRENT) USE OF ANTIBIOTICS: Primary | ICD-10-CM

## 2020-08-26 DIAGNOSIS — I87.2 VENOUS INSUFFICIENCY OF LEFT LEG: ICD-10-CM

## 2020-08-26 DIAGNOSIS — R60.0 BILATERAL LEG EDEMA: ICD-10-CM

## 2020-08-26 PROCEDURE — 1101F PR PT FALLS ASSESS DOC 0-1 FALLS W/OUT INJ PAST YR: ICD-10-PCS | Mod: ,,, | Performed by: INTERNAL MEDICINE

## 2020-08-26 PROCEDURE — 1159F MED LIST DOCD IN RCRD: CPT | Mod: ,,, | Performed by: INTERNAL MEDICINE

## 2020-08-26 PROCEDURE — 99213 OFFICE O/P EST LOW 20 MIN: CPT | Mod: 95,,, | Performed by: INTERNAL MEDICINE

## 2020-08-26 PROCEDURE — 1159F PR MEDICATION LIST DOCUMENTED IN MEDICAL RECORD: ICD-10-PCS | Mod: ,,, | Performed by: INTERNAL MEDICINE

## 2020-08-26 PROCEDURE — 99213 PR OFFICE/OUTPT VISIT, EST, LEVL III, 20-29 MIN: ICD-10-PCS | Mod: 95,,, | Performed by: INTERNAL MEDICINE

## 2020-08-26 PROCEDURE — 1101F PT FALLS ASSESS-DOCD LE1/YR: CPT | Mod: ,,, | Performed by: INTERNAL MEDICINE

## 2020-08-26 RX ORDER — CEPHALEXIN 500 MG/1
500 CAPSULE ORAL EVERY 12 HOURS
Qty: 180 CAPSULE | Refills: 3 | Status: SHIPPED | OUTPATIENT
Start: 2020-08-26 | End: 2020-10-09

## 2020-08-26 NOTE — PROGRESS NOTES
"Subjective:       Patient ID: Xi Unger is a 80 y.o. male.    Chief Complaint:: No chief complaint on file.    HPI  10/5/18: Has not had an infusion of IVIG since July 13. It was felt that the IVIG was volume overloading him.   Has been in the hospital twice since then with CHF and a fall. Given IV ancef while there last week for the possibility of infectious cellulitis but his venous stasis is no change. .   9/25 dr. Chan put "some medicine" in his knee, which is probably a corticosteroid.   His wife has had a lot more difficulty transferring him from chair to bed and their home physical therapy is not adding very much. Discussed bed to chair  height issues    4/22/19: since last visit, he was admitted 10/9 because of inability to walk or stand, treated for cellulitis with a week of zyvox and sent to rehab at [facility], which he did not like and made no progress. He cannot stand due to weakness, but can make a transfer from chair to car. He had another ED visit for nosebleed, required packing and subsequent removal by ENT, was off Eliquis x 2 weeks. He is stable on keflex for suppression of peripheral cellulitis. He is not receiving IVIG because he cannot tolerate the fluid volume. Still taking the keflex bid and took it 4/day while nose was packed because he had "a sinus infection" per ENT. Probably is not elevating his legs as much as he should at home. Is compliant with compression stockings. His wife denies that he has any skin breakdown of his feet or intertriginous areas.  10/4/19: admitted to [facility] 9/11-22,  with acute onset of CVA and received TPA and endovascular thrombectomy with excellent result. He  Remains on cephalexin for suppression of cellulitis and left TKA  Infection.  Has had a number of nosebleeds, last while he was at [facility]. He did receive blood as well for TPA related blood loss and nosebleed. She has been referred to Dr. Poole.   Flu vaccine has been given.  Creatinine most recently was " 2.5.  Tolerating cephalexin, no episodes of acute cellulitis.    8/26/20: reviewed interim. Had a couple of admissions for fluid, had an angiogram, his kidneys tolerated it. Reviewed meds. He still takes keflex 500 mg BID as a prophylaxis. A month ago he was given ceftin for redness of the left leg, with improvement, one month ago (advised not much difference between keflex and ceftin). Discussed venous stasis vs infection.  No thrush, yeast candidiasis, diarrhea. He has had a colonoscopy for work up of anemia and he required blood transfusion. He also find nodular mucosa in the colon. He had tubular adenomas partially resected. No further procedures are needed until a repeat colonoscopy next year.   This was a telemed visit, The patient location is: home,  The chief complaint leading to consultation is: long term use of oral antibiotics  Visit type: audiovisual  Face to Face time with patient: 15  20 minutes of total time spent on the encounter, which includes face to face time and non-face to face time preparing to see the patient (eg, review of tests), Obtaining and/or reviewing separately obtained history, Documenting clinical information in the electronic or other health record, Independently interpreting results (not separately reported) and communicating results to the patient/family/caregiver, or Care coordination (not separately reported).   Each patient to whom he or she provides medical services by telemedicine is:  (1) informed of the relationship between the physician and patient and the respective role of any other health care provider with respect to management of the patient; and (2) notified that he or she may decline to receive medical services by telemedicine and may withdraw from such care at any time.        Review of patient's allergies indicates:   Allergen Reactions    Ertapenem sodium      marked weakness to LE's required hospital admission    Fentanyl Shortness Of Breath    Ciprofloxacin      Darvon [propoxyphene]     Dilaudid [hydromorphone]     Doxycycline     Invanz [ertapenem] Other (See Comments)     Severe muscle weakness, unable to walk      Iodinated contrast- oral and iv dye     Iodine and iodide containing products     Percocet [oxycodone-acetaminophen]     Sulfur     Clindamycin Rash    Sulfa (sulfonamide antibiotics) Other (See Comments)     Past Medical History:   Diagnosis Date    Anemia     Anemia of chronic renal failure     Cancer     basal cell carcinoma    Cataract     removed 20 years ago    CHF (congestive heart failure)  Coronary artery disease  Chronic kidney disease  bPH  History of Strongyloides infection  History of Enterobacter pneumonia March 2015  History of multiple left knee prosthetic joint infections  History of Achromobacter bacteremia July 2016  History of strep bovis bacteremia November 2016  Adenomatous polyps of the colon  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     Chronic edema     legs, no DVT    COPD (chronic obstructive pulmonary disease)     Encounter for blood transfusion     Gout     Macular degeneration     07/2018    On home oxygen therapy     at night as needed    Paroxysmal atrial fibrillation     Currently off blood thinners on Amiodarone   CVA 9/11/19 treated with tPA an endovascular thrombectomy at [facility]  Past Surgical History:   Procedure Laterality Date    AORTIC VALVE REPLACEMENT  2010    CARDIAC SURGERY  2010    CABG x 2, s/p AVR    CARDIAC VALVE REPLACEMENT      aortic valve porcine    COLON SURGERY      COLONOSCOPY Left 11/10/2016    Procedure: COLONOSCOPY;  Surgeon: Eliel Basurto MD;  Location: [facility];  Service: Endoscopy;  Laterality: Left;    COLONOSCOPY N/A 3/15/2020    Procedure: COLONOSCOPY;  Surgeon: Cody Hedrick MD;  Location: [facility];  Service: Endoscopy;  Laterality: N/A;    CORONARY ANGIOGRAPHY INCLUDING BYPASS GRAFTS WITH CATHETERIZATION OF LEFT HEART N/A 1/31/2020     Procedure: ANGIOGRAM, CORONARY, INCLUDING BYPASS GRAFT, WITH LEFT HEART CATHETERIZATION;  Surgeon: Anthony Dumont MD;  Location: [facility];  Service: Cardiology;  Laterality: N/A;    CORONARY ARTERY BYPASS GRAFT  2010    x 2    CORONARY STENT PLACEMENT      approx. 2006    ESOPHAGOGASTRODUODENOSCOPY N/A 3/13/2020    Procedure: EGD (ESOPHAGOGASTRODUODENOSCOPY);  Surgeon: ROBERTA Unger MD;  Location[facility];  Service: Endoscopy;  Laterality: N/A;    EYE SURGERY      cataracts    HERNIA REPAIR      JOINT REPLACEMENT      bilateral knee replacements; revision left x 2    loop recorder      LUMBAR PARAVERTEBRAL FACET JOINT NERVE BLOCK N/A 2019    Procedure: BLOCK, NERVE, FACET JOINT, LUMBAR L4-S1 Bilateral;  Surgeon: Bri Douglas II, MD;  Location: [facility];  Service: Pain Management;  Laterality: N/A;    LUMBAR PARAVERTEBRAL FACET JOINT NERVE BLOCK Bilateral 2019    Procedure: BLOCK, NERVE, FACET JOINT, LUMBAR, L4-S1;  Surgeon: Bri Douglas II, MD;  Location: [facility];  Service: Pain Management;  Laterality: Bilateral;    SKIN BIOPSY      TONSILLECTOMY     cataract extraction  Carpal tunnel repair  Social History     Tobacco Use    Smoking status: Former Smoker     Types: Cigarettes     Quit date: 1985     Years since quittin.6    Smokeless tobacco: Never Used   Substance Use Topics    Alcohol use: No     Frequency: Never     Social History     Occupational History    Occupation: retired     Family History   Problem Relation Age of Onset    Heart disease Mother     Heart disease Father        Pertinent medications noted:     Review of Systems    Constitutional: No fever, chills, sweats,has fatigue, weakness which is perhaps a little worse, but not new  Eyes: No change in vision,     ENT: No sore throat, mouth pains, or lesions    Cardiovascular: No chest pain, hasDOE, and chronic pedal edema, improved lately, oxygen dependent    Respiratory: No increase in shortness of  breath, RANDOLPH, cough,  Gastrointestinal: No abdominal pain, nausea, vomiting, diarrhea, or focal abdominal pain    Genitourinary:     Musculoskeletal: Left knee is always painful and he has developed a flexion contracture of the left knee    Integumentary: No new rashes, skin lesions, or wounds. Chronic venous stasis left greater than right, better than usual    Neurological: No dizziness, vertigo, unusual headaches, but his wife finds him occasionally confused since the stroke    Psychiatric:  Seems depressed, perhaps a little more withdrawn than usual    Endocrine: o diabetes  Lymphatic: No lymphadenopathy, blood loss,  VAD:     Objective:      There were no vitals taken for this visit. There is no height or weight on file to calculate BMI.  Physical Exam    THE EXAM BELOW IS FROM 10/2019:   CURRENTLY I CAN SEE HIS FACE WHICH IS BASELINE, AND SEE THAT HE IS IN HIS CHAIR WITH LEGS ELEVATED    General: Alert and attentive, cooperative and in no distress. In a wheelchair, oxygen dependent    Eyes:   anicteric, EOMI    Neck: Supple,     ENT: nares patent, no oral lesions,  no thrush    Integumentary:chronic venous stasis hyperpigmentation left lower leg    Cardiovascular: iRegular rate and rhythm, no murmurs, rubs, or gallop    Respiratory: very mild bibasal crackles    Gastrointestinal:  No distention    Genitourinary:     Breasts:     Rectal/Anal:     Vascular: chronic venous stasis left greater than right, compression stockings in place and looks much better than usual and his legs are smaller than usual  Musculoskeletal:  no acute arthritis, synovitis or myositis. requires wheelchair. No palpable fluid in or around the left knee. His ability to extend the leg is painful due to pain in the left knee.  He has lost tremendous amount of range of motion of the left knee.  The right knee can extend to 180°    Lymphatic:   Neurological: Normal LOC,  speech,  Psychiatric:  Usual mood, speech,  demeanor       Recent  Diagnostics:review laboratory studies in [facility]        Assessment:       Problem List Items Addressed This Visit     Bilateral leg edema    Venous insufficiency of left leg    Encounter for long-term (current) use of antibiotics - Primary           Plan:             Continue cephalexin 500 mg once a day  If his legs become more red, I would make more of an effort to elevate.   If the redness develops and he feels poorly , I would increase the keflex to 3 times a day for a week or so.     Flu vaccine in October    Follow up 6-12 months  This note was created using Dragon voice recognition software that occasionally misinterpreted phrases or words.

## 2020-08-26 NOTE — PATIENT INSTRUCTIONS
Continue cephalexin 500 mg once a day  If his legs become more red, I would make more of an effort to elevate.   If the redness develops and he feels poorly , I would increase the keflex to 3 times a day for a week or so.     Flu vaccine in October    Follow up 6-12 months

## 2020-12-02 DIAGNOSIS — Z79.2 ENCOUNTER FOR LONG-TERM (CURRENT) USE OF ANTIBIOTICS: Primary | ICD-10-CM

## 2020-12-02 DIAGNOSIS — T84.498D MECHANICAL COMPLICATION OF INTERNAL ORTHOPEDIC DEVICE, IMPLANT OR GRAFT, SUBSEQUENT ENCOUNTER: ICD-10-CM

## 2020-12-02 RX ORDER — CEPHALEXIN 500 MG/1
500 CAPSULE ORAL 2 TIMES DAILY
COMMUNITY
End: 2020-12-02 | Stop reason: SDUPTHER

## 2020-12-02 RX ORDER — CEPHALEXIN 500 MG/1
500 CAPSULE ORAL 2 TIMES DAILY
Qty: 180 CAPSULE | Refills: 2 | Status: SHIPPED | OUTPATIENT
Start: 2020-12-02 | End: 2021-01-01 | Stop reason: SDUPTHER

## 2020-12-02 NOTE — TELEPHONE ENCOUNTER
----- Message from Grace Key sent at 12/2/2020 10:36 AM CST -----  Needs to get refill of Cephalexin 500 mg. #180 sent to [facility]. When Mrs. Unger called them to request refill was told Dr. Baez cancelled it on 8/27/20. She said that he still takes morning and night for his leg. He has not been in the hospital now in almost a year. Please call her when sent in. [phone]

## 2020-12-03 ENCOUNTER — TELEPHONE (OUTPATIENT)
Dept: INFECTIOUS DISEASES | Facility: CLINIC | Age: 80
End: 2020-12-03

## 2020-12-03 NOTE — TELEPHONE ENCOUNTER
----- Message from Grace Key sent at 12/3/2020  9:05 AM CST -----  Mrs. Unger said she called yesterday about his rx for cephalexin 500 mg. She called to [facility] and was told that Dr Baez cancelled it 8/11/20 yet they had filled on 8/26/20. She was under thi impression that he was going to be on this long term. She would like to know why rx was cancelled, if indeed it was. If he is still supposed to be taking she would like a 90 day sent to [facility]. Her number is [phone]

## 2021-01-01 ENCOUNTER — PATIENT MESSAGE (OUTPATIENT)
Dept: DERMATOLOGY | Facility: CLINIC | Age: 81
End: 2021-01-01

## 2021-01-01 ENCOUNTER — TELEPHONE (OUTPATIENT)
Dept: DERMATOLOGY | Facility: CLINIC | Age: 81
End: 2021-01-01

## 2021-01-01 ENCOUNTER — DOCUMENT SCAN (OUTPATIENT)
Dept: HOME HEALTH SERVICES | Facility: HOSPITAL | Age: 81
End: 2021-01-01

## 2021-01-01 ENCOUNTER — OFFICE VISIT (OUTPATIENT)
Dept: INFECTIOUS DISEASES | Facility: CLINIC | Age: 81
End: 2021-01-01
Payer: COMMERCIAL

## 2021-01-01 ENCOUNTER — DOCUMENT SCAN (OUTPATIENT)
Dept: HOME HEALTH SERVICES | Facility: HOSPITAL | Age: 81
End: 2021-01-01
Payer: COMMERCIAL

## 2021-01-01 ENCOUNTER — EXTERNAL HOME HEALTH (OUTPATIENT)
Dept: HOME HEALTH SERVICES | Facility: HOSPITAL | Age: 81
End: 2021-01-01

## 2021-01-01 ENCOUNTER — PROCEDURE VISIT (OUTPATIENT)
Dept: DERMATOLOGY | Facility: CLINIC | Age: 81
End: 2021-01-01
Payer: COMMERCIAL

## 2021-01-01 ENCOUNTER — EXTERNAL HOME HEALTH (OUTPATIENT)
Dept: HOME HEALTH SERVICES | Facility: HOSPITAL | Age: 81
End: 2021-01-01
Payer: COMMERCIAL

## 2021-01-01 ENCOUNTER — OFFICE VISIT (OUTPATIENT)
Dept: DERMATOLOGY | Facility: CLINIC | Age: 81
End: 2021-01-01
Payer: COMMERCIAL

## 2021-01-01 VITALS — BODY MASS INDEX: 29.27 KG/M2 | RESPIRATION RATE: 20 BRPM | HEIGHT: 74 IN

## 2021-01-01 VITALS — HEIGHT: 74 IN | BODY MASS INDEX: 29.26 KG/M2 | WEIGHT: 228 LBS

## 2021-01-01 DIAGNOSIS — L08.9 WOUND INFECTION: Primary | ICD-10-CM

## 2021-01-01 DIAGNOSIS — D09.9 SQUAMOUS CELL CARCINOMA IN SITU (SCCIS): Primary | ICD-10-CM

## 2021-01-01 DIAGNOSIS — L82.1 SEBORRHEIC KERATOSES: ICD-10-CM

## 2021-01-01 DIAGNOSIS — D80.1 HYPOGAMMAGLOBULINEMIA: ICD-10-CM

## 2021-01-01 DIAGNOSIS — D04.62 SQUAMOUS CELL CARCINOMA IN SITU (SCCIS) OF SKIN OF LEFT UPPER ARM: ICD-10-CM

## 2021-01-01 DIAGNOSIS — T14.8XXA WOUND INFECTION: ICD-10-CM

## 2021-01-01 DIAGNOSIS — Z85.828 HX OF NONMELANOMA SKIN CANCER: ICD-10-CM

## 2021-01-01 DIAGNOSIS — R60.0 BILATERAL LEG EDEMA: ICD-10-CM

## 2021-01-01 DIAGNOSIS — D04.5 SQUAMOUS CELL CARCINOMA IN SITU (SCCIS) OF SKIN OF BACK: Primary | ICD-10-CM

## 2021-01-01 DIAGNOSIS — T14.8XXA WOUND INFECTION: Primary | ICD-10-CM

## 2021-01-01 DIAGNOSIS — L08.9 WOUND INFECTION: ICD-10-CM

## 2021-01-01 DIAGNOSIS — I87.2 VENOUS INSUFFICIENCY OF LEFT LEG: Primary | ICD-10-CM

## 2021-01-01 DIAGNOSIS — Z79.2 ENCOUNTER FOR LONG-TERM (CURRENT) USE OF ANTIBIOTICS: ICD-10-CM

## 2021-01-01 DIAGNOSIS — D48.5 NEOPLASM OF UNCERTAIN BEHAVIOR OF SKIN: Primary | ICD-10-CM

## 2021-01-01 LAB
BACTERIA SPEC AEROBE CULT: NORMAL
FINAL PATHOLOGIC DIAGNOSIS: NORMAL
GROSS: NORMAL
MICROSCOPIC EXAM: NORMAL

## 2021-01-01 PROCEDURE — 88305 TISSUE EXAM BY PATHOLOGIST: CPT | Mod: 26,,, | Performed by: PATHOLOGY

## 2021-01-01 PROCEDURE — 1126F AMNT PAIN NOTED NONE PRSNT: CPT | Mod: S$GLB,,, | Performed by: DERMATOLOGY

## 2021-01-01 PROCEDURE — 88305 TISSUE EXAM BY PATHOLOGIST: CPT | Performed by: PATHOLOGY

## 2021-01-01 PROCEDURE — 87070 CULTURE OTHR SPECIMN AEROBIC: CPT | Performed by: DERMATOLOGY

## 2021-01-01 PROCEDURE — 1159F MED LIST DOCD IN RCRD: CPT | Mod: ,,, | Performed by: INTERNAL MEDICINE

## 2021-01-01 PROCEDURE — 88305 TISSUE EXAM BY PATHOLOGIST: ICD-10-PCS | Mod: 26,,, | Performed by: PATHOLOGY

## 2021-01-01 PROCEDURE — 11103 TANGNTL BX SKIN EA SEP/ADDL: CPT | Mod: S$GLB,,, | Performed by: DERMATOLOGY

## 2021-01-01 PROCEDURE — 3288F FALL RISK ASSESSMENT DOCD: CPT | Mod: ,,, | Performed by: INTERNAL MEDICINE

## 2021-01-01 PROCEDURE — 99202 PR OFFICE/OUTPT VISIT, NEW, LEVL II, 15-29 MIN: ICD-10-PCS | Mod: 25,S$GLB,, | Performed by: DERMATOLOGY

## 2021-01-01 PROCEDURE — 17262 PR DESTR MALIG TRUNK,EXTREM 1.1-2 CM: ICD-10-PCS | Mod: 59,S$GLB,, | Performed by: DERMATOLOGY

## 2021-01-01 PROCEDURE — 99499 UNLISTED E&M SERVICE: CPT | Mod: S$GLB,,, | Performed by: DERMATOLOGY

## 2021-01-01 PROCEDURE — 1101F PT FALLS ASSESS-DOCD LE1/YR: CPT | Mod: ,,, | Performed by: INTERNAL MEDICINE

## 2021-01-01 PROCEDURE — 17263 PR DESTR MALIG TRUNK,EXTREM 2.1-3 CM: ICD-10-PCS | Mod: S$GLB,,, | Performed by: DERMATOLOGY

## 2021-01-01 PROCEDURE — 99213 OFFICE O/P EST LOW 20 MIN: CPT | Mod: 95,,, | Performed by: INTERNAL MEDICINE

## 2021-01-01 PROCEDURE — 1101F PR PT FALLS ASSESS DOC 0-1 FALLS W/OUT INJ PAST YR: ICD-10-PCS | Mod: CPTII,S$GLB,, | Performed by: DERMATOLOGY

## 2021-01-01 PROCEDURE — 3288F PR FALLS RISK ASSESSMENT DOCUMENTED: ICD-10-PCS | Mod: CPTII,S$GLB,, | Performed by: DERMATOLOGY

## 2021-01-01 PROCEDURE — 99213 PR OFFICE/OUTPT VISIT, EST, LEVL III, 20-29 MIN: ICD-10-PCS | Mod: 95,,, | Performed by: INTERNAL MEDICINE

## 2021-01-01 PROCEDURE — 11102 TANGNTL BX SKIN SINGLE LES: CPT | Mod: S$GLB,,, | Performed by: DERMATOLOGY

## 2021-01-01 PROCEDURE — 17262 DSTRJ MAL LES T/A/L 1.1-2.0: CPT | Mod: 59,S$GLB,, | Performed by: DERMATOLOGY

## 2021-01-01 PROCEDURE — 99202 OFFICE O/P NEW SF 15 MIN: CPT | Mod: 25,S$GLB,, | Performed by: DERMATOLOGY

## 2021-01-01 PROCEDURE — 1159F MED LIST DOCD IN RCRD: CPT | Mod: S$GLB,,, | Performed by: DERMATOLOGY

## 2021-01-01 PROCEDURE — 99499 NO LOS: ICD-10-PCS | Mod: S$GLB,,, | Performed by: DERMATOLOGY

## 2021-01-01 PROCEDURE — 11103 PR TANGENTIAL BIOPSY, SKIN, EA ADDTL LESION: ICD-10-PCS | Mod: S$GLB,,, | Performed by: DERMATOLOGY

## 2021-01-01 PROCEDURE — 17263 DSTRJ MAL LES T/A/L 2.1-3.0: CPT | Mod: S$GLB,,, | Performed by: DERMATOLOGY

## 2021-01-01 PROCEDURE — 1159F PR MEDICATION LIST DOCUMENTED IN MEDICAL RECORD: ICD-10-PCS | Mod: ,,, | Performed by: INTERNAL MEDICINE

## 2021-01-01 PROCEDURE — 3288F FALL RISK ASSESSMENT DOCD: CPT | Mod: CPTII,S$GLB,, | Performed by: DERMATOLOGY

## 2021-01-01 PROCEDURE — 11102 PR TANGENTIAL BIOPSY, SKIN, SINGLE LESION: ICD-10-PCS | Mod: S$GLB,,, | Performed by: DERMATOLOGY

## 2021-01-01 PROCEDURE — 99999 PR PBB SHADOW E&M-EST. PATIENT-LVL III: CPT | Mod: PBBFAC,,, | Performed by: DERMATOLOGY

## 2021-01-01 PROCEDURE — 1101F PR PT FALLS ASSESS DOC 0-1 FALLS W/OUT INJ PAST YR: ICD-10-PCS | Mod: ,,, | Performed by: INTERNAL MEDICINE

## 2021-01-01 PROCEDURE — 99999 PR PBB SHADOW E&M-EST. PATIENT-LVL III: ICD-10-PCS | Mod: PBBFAC,,, | Performed by: DERMATOLOGY

## 2021-01-01 PROCEDURE — 1126F PR PAIN SEVERITY QUANTIFIED, NO PAIN PRESENT: ICD-10-PCS | Mod: S$GLB,,, | Performed by: DERMATOLOGY

## 2021-01-01 PROCEDURE — 1101F PT FALLS ASSESS-DOCD LE1/YR: CPT | Mod: CPTII,S$GLB,, | Performed by: DERMATOLOGY

## 2021-01-01 PROCEDURE — 3288F PR FALLS RISK ASSESSMENT DOCUMENTED: ICD-10-PCS | Mod: ,,, | Performed by: INTERNAL MEDICINE

## 2021-01-01 PROCEDURE — 1159F PR MEDICATION LIST DOCUMENTED IN MEDICAL RECORD: ICD-10-PCS | Mod: S$GLB,,, | Performed by: DERMATOLOGY

## 2021-01-01 RX ORDER — MUPIROCIN 20 MG/G
OINTMENT TOPICAL 3 TIMES DAILY
Qty: 22 G | Refills: 1 | Status: ON HOLD | OUTPATIENT
Start: 2021-01-01 | End: 2021-01-01 | Stop reason: HOSPADM

## 2021-01-01 RX ORDER — MUPIROCIN 20 MG/G
OINTMENT TOPICAL DAILY
Status: SHIPPED | OUTPATIENT
Start: 2021-01-01

## 2021-01-01 RX ORDER — FLUOROURACIL 50 MG/G
CREAM TOPICAL
Qty: 40 G | Refills: 1 | Status: SHIPPED | OUTPATIENT
Start: 2021-01-01 | End: 2021-01-01

## 2021-01-01 RX ORDER — AMOXICILLIN AND CLAVULANATE POTASSIUM 875; 125 MG/1; MG/1
1 TABLET, FILM COATED ORAL 2 TIMES DAILY
COMMUNITY
Start: 2021-01-01 | End: 2021-01-01

## 2021-01-01 RX ORDER — CEPHALEXIN 500 MG/1
500 CAPSULE ORAL 2 TIMES DAILY
Qty: 180 CAPSULE | Refills: 2 | Status: ON HOLD | OUTPATIENT
Start: 2021-01-01 | End: 2021-01-01 | Stop reason: HOSPADM

## 2021-01-01 RX ORDER — FLUOROURACIL 50 MG/G
CREAM TOPICAL 2 TIMES DAILY
Qty: 40 G | Refills: 1 | Status: CANCELLED | OUTPATIENT
Start: 2021-01-01

## 2021-01-18 ENCOUNTER — OFFICE VISIT (OUTPATIENT)
Dept: INFECTIOUS DISEASES | Facility: CLINIC | Age: 81
End: 2021-01-18
Payer: COMMERCIAL

## 2021-01-18 DIAGNOSIS — Z79.2 ENCOUNTER FOR LONG-TERM (CURRENT) USE OF ANTIBIOTICS: Primary | ICD-10-CM

## 2021-01-18 DIAGNOSIS — R60.0 BILATERAL LEG EDEMA: ICD-10-CM

## 2021-01-18 DIAGNOSIS — D80.1 HYPOGAMMAGLOBULINEMIA: ICD-10-CM

## 2021-01-18 DIAGNOSIS — I87.2 VENOUS INSUFFICIENCY OF LEFT LEG: ICD-10-CM

## 2021-01-18 DIAGNOSIS — Z71.89 EDUCATED ABOUT COVID-19 VIRUS INFECTION: ICD-10-CM

## 2021-01-18 PROCEDURE — 1159F MED LIST DOCD IN RCRD: CPT | Mod: ,,, | Performed by: INTERNAL MEDICINE

## 2021-01-18 PROCEDURE — 1159F PR MEDICATION LIST DOCUMENTED IN MEDICAL RECORD: ICD-10-PCS | Mod: ,,, | Performed by: INTERNAL MEDICINE

## 2021-01-18 PROCEDURE — 99213 PR OFFICE/OUTPT VISIT, EST, LEVL III, 20-29 MIN: ICD-10-PCS | Mod: 95,,, | Performed by: INTERNAL MEDICINE

## 2021-01-18 PROCEDURE — 99213 OFFICE O/P EST LOW 20 MIN: CPT | Mod: 95,,, | Performed by: INTERNAL MEDICINE

## 2021-02-05 ENCOUNTER — PATIENT MESSAGE (OUTPATIENT)
Dept: DERMATOLOGY | Facility: CLINIC | Age: 81
End: 2021-02-05

## 2021-04-12 PROBLEM — N18.30 CHRONIC KIDNEY DISEASE, STAGE III (MODERATE): Status: ACTIVE | Noted: 2021-01-01

## 2021-09-03 PROBLEM — R73.9 HYPERGLYCEMIA: Status: ACTIVE | Noted: 2021-01-01

## 2021-09-03 PROBLEM — I50.43 ACUTE ON CHRONIC COMBINED SYSTOLIC AND DIASTOLIC HEART FAILURE: Status: ACTIVE | Noted: 2021-01-01

## 2021-09-04 PROBLEM — L03.115 CELLULITIS OF RIGHT FOOT: Status: ACTIVE | Noted: 2021-01-01

## 2022-01-01 ENCOUNTER — DOCUMENT SCAN (OUTPATIENT)
Dept: HOME HEALTH SERVICES | Facility: HOSPITAL | Age: 82
End: 2022-01-01
Payer: COMMERCIAL

## 2022-01-01 ENCOUNTER — TELEPHONE (OUTPATIENT)
Dept: SURGERY | Facility: CLINIC | Age: 82
End: 2022-01-01
Payer: COMMERCIAL

## 2022-01-01 ENCOUNTER — EXTERNAL HOME HEALTH (OUTPATIENT)
Dept: HOME HEALTH SERVICES | Facility: HOSPITAL | Age: 82
End: 2022-01-01
Payer: COMMERCIAL

## 2022-02-11 NOTE — TELEPHONE ENCOUNTER
----- Message from Eri Rahman sent at 2/11/2022 12:07 PM CST -----  Digna with [facility] is returning your call.  She is asking that you fax what you need to her at [phone] and you can call her at [phone].  Thank you!

## 2022-02-17 PROBLEM — S82.143A CLOSED FRACTURE OF TIBIAL PLATEAU: Status: ACTIVE | Noted: 2022-01-01

## 2022-02-17 PROBLEM — S82.143A CLOSED FRACTURE OF TIBIAL PLATEAU: Status: RESOLVED | Noted: 2022-01-01 | Resolved: 2022-01-01

## 2022-02-17 PROBLEM — Z71.89 ACP (ADVANCE CARE PLANNING): Status: ACTIVE | Noted: 2018-10-05

## 2022-02-17 PROBLEM — N18.6 ESRD (END STAGE RENAL DISEASE): Status: ACTIVE | Noted: 2022-01-01

## 2022-02-17 PROBLEM — K80.20 CALCULUS OF GALLBLADDER WITHOUT CHOLECYSTITIS WITHOUT OBSTRUCTION: Status: ACTIVE | Noted: 2022-01-01

## 2022-02-17 PROBLEM — I48.91 ATRIAL FIBRILLATION WITH RVR: Status: ACTIVE | Noted: 2022-01-01

## 2022-02-17 PROBLEM — S82.209A FRACTURE OF TIBIA: Status: ACTIVE | Noted: 2022-01-01

## 2022-02-18 PROBLEM — R53.81 DEBILITY: Status: ACTIVE | Noted: 2022-01-01

## 2022-03-01 PROBLEM — E87.1 HYPONATREMIA: Status: ACTIVE | Noted: 2022-01-01

## 2022-03-04 PROBLEM — Z75.8 DISCHARGE PLANNING ISSUES: Status: ACTIVE | Noted: 2022-01-01

## 2022-03-24 ENCOUNTER — DOCUMENT SCAN (OUTPATIENT)
Dept: HOME HEALTH SERVICES | Facility: HOSPITAL | Age: 82
End: 2022-03-24
Payer: COMMERCIAL